# Patient Record
Sex: MALE | Race: WHITE | NOT HISPANIC OR LATINO | Employment: OTHER | URBAN - METROPOLITAN AREA
[De-identification: names, ages, dates, MRNs, and addresses within clinical notes are randomized per-mention and may not be internally consistent; named-entity substitution may affect disease eponyms.]

---

## 2017-01-01 ENCOUNTER — TELEPHONE (OUTPATIENT)
Dept: INTERNAL MEDICINE | Facility: CLINIC | Age: 82
End: 2017-01-01

## 2017-01-01 ENCOUNTER — LAB (OUTPATIENT)
Dept: INTERNAL MEDICINE | Facility: CLINIC | Age: 82
End: 2017-01-01

## 2017-01-01 ENCOUNTER — CLINICAL SUPPORT (OUTPATIENT)
Dept: INTERNAL MEDICINE | Facility: CLINIC | Age: 82
End: 2017-01-01

## 2017-01-01 ENCOUNTER — OFFICE VISIT (OUTPATIENT)
Dept: INTERNAL MEDICINE | Facility: CLINIC | Age: 82
End: 2017-01-01

## 2017-01-01 ENCOUNTER — CLINICAL SUPPORT NO REQUIREMENTS (OUTPATIENT)
Dept: CARDIOLOGY | Facility: CLINIC | Age: 82
End: 2017-01-01

## 2017-01-01 ENCOUNTER — OFFICE VISIT (OUTPATIENT)
Dept: PULMONOLOGY | Facility: CLINIC | Age: 82
End: 2017-01-01

## 2017-01-01 ENCOUNTER — ANTICOAGULATION VISIT (OUTPATIENT)
Dept: INTERNAL MEDICINE | Facility: CLINIC | Age: 82
End: 2017-01-01

## 2017-01-01 VITALS
BODY MASS INDEX: 17.74 KG/M2 | WEIGHT: 113 LBS | TEMPERATURE: 97 F | HEIGHT: 67 IN | HEART RATE: 57 BPM | RESPIRATION RATE: 16 BRPM | SYSTOLIC BLOOD PRESSURE: 110 MMHG | OXYGEN SATURATION: 94 % | DIASTOLIC BLOOD PRESSURE: 65 MMHG

## 2017-01-01 VITALS
WEIGHT: 116.13 LBS | DIASTOLIC BLOOD PRESSURE: 62 MMHG | RESPIRATION RATE: 16 BRPM | TEMPERATURE: 96.8 F | HEART RATE: 52 BPM | BODY MASS INDEX: 18.19 KG/M2 | SYSTOLIC BLOOD PRESSURE: 110 MMHG

## 2017-01-01 VITALS
HEART RATE: 52 BPM | BODY MASS INDEX: 18.14 KG/M2 | WEIGHT: 115.8 LBS | SYSTOLIC BLOOD PRESSURE: 102 MMHG | DIASTOLIC BLOOD PRESSURE: 78 MMHG

## 2017-01-01 DIAGNOSIS — R73.03 PREDIABETES: ICD-10-CM

## 2017-01-01 DIAGNOSIS — L89.92 DECUBITUS ULCER, STAGE II (HCC): ICD-10-CM

## 2017-01-01 DIAGNOSIS — J43.9 PULMONARY EMPHYSEMA, UNSPECIFIED EMPHYSEMA TYPE (HCC): Primary | ICD-10-CM

## 2017-01-01 DIAGNOSIS — I48.20 CHRONIC ATRIAL FIBRILLATION (HCC): ICD-10-CM

## 2017-01-01 DIAGNOSIS — Z13.220 LIPID SCREENING: ICD-10-CM

## 2017-01-01 DIAGNOSIS — R79.9 ABNORMAL FINDING OF BLOOD CHEMISTRY: ICD-10-CM

## 2017-01-01 DIAGNOSIS — E53.8 VITAMIN B 12 DEFICIENCY: ICD-10-CM

## 2017-01-01 DIAGNOSIS — I25.119 CORONARY ARTERY DISEASE WITH ANGINA PECTORIS, UNSPECIFIED VESSEL OR LESION TYPE, UNSPECIFIED WHETHER NATIVE OR TRANSPLANTED HEART (HCC): ICD-10-CM

## 2017-01-01 DIAGNOSIS — R54 FRAIL ELDERLY: Primary | ICD-10-CM

## 2017-01-01 DIAGNOSIS — J96.20 ACUTE ON CHRONIC RESPIRATORY FAILURE, UNSPECIFIED WHETHER WITH HYPOXIA OR HYPERCAPNIA (HCC): ICD-10-CM

## 2017-01-01 DIAGNOSIS — N40.1 BENIGN PROSTATIC HYPERPLASIA WITH LOWER URINARY TRACT SYMPTOMS, UNSPECIFIED MORPHOLOGY: Primary | ICD-10-CM

## 2017-01-01 DIAGNOSIS — Z72.0 TOBACCO ABUSE: ICD-10-CM

## 2017-01-01 DIAGNOSIS — I42.9 CARDIOMYOPATHY (HCC): Primary | ICD-10-CM

## 2017-01-01 DIAGNOSIS — I48.91 ATRIAL FIBRILLATION, UNSPECIFIED TYPE (HCC): Primary | ICD-10-CM

## 2017-01-01 LAB
ALBUMIN SERPL-MCNC: 4.2 G/DL (ref 3.2–4.8)
ALBUMIN/GLOB SERPL: 1.4 G/DL (ref 1.5–2.5)
ALP SERPL-CCNC: 60 U/L (ref 25–100)
ALT SERPL W P-5'-P-CCNC: 8 U/L (ref 7–40)
ANION GAP SERPL CALCULATED.3IONS-SCNC: 7 MMOL/L (ref 3–11)
ARTICHOKE IGE QN: 64 MG/DL (ref 0–130)
AST SERPL-CCNC: 12 U/L (ref 0–33)
BILIRUB SERPL-MCNC: 0.5 MG/DL (ref 0.3–1.2)
BUN BLD-MCNC: 21 MG/DL (ref 9–23)
BUN/CREAT SERPL: 23.3 (ref 7–25)
CALCIUM SPEC-SCNC: 8.9 MG/DL (ref 8.7–10.4)
CHLORIDE SERPL-SCNC: 97 MMOL/L (ref 99–109)
CHOLEST SERPL-MCNC: 152 MG/DL (ref 0–200)
CO2 SERPL-SCNC: 30 MMOL/L (ref 20–31)
CREAT BLD-MCNC: 0.9 MG/DL (ref 0.6–1.3)
DEPRECATED RDW RBC AUTO: 54.4 FL (ref 37–54)
ERYTHROCYTE [DISTWIDTH] IN BLOOD BY AUTOMATED COUNT: 15.1 % (ref 11.3–14.5)
GFR SERPL CREATININE-BSD FRML MDRD: 80 ML/MIN/1.73
GLOBULIN UR ELPH-MCNC: 2.9 GM/DL
GLUCOSE BLD-MCNC: 64 MG/DL (ref 70–100)
HCT VFR BLD AUTO: 37.9 % (ref 38.9–50.9)
HDLC SERPL-MCNC: 66 MG/DL (ref 40–60)
HGB BLD-MCNC: 12.4 G/DL (ref 13.1–17.5)
INR PPP: 1.3 (ref 2–3)
INR PPP: 1.6 (ref 2–3)
INR PPP: 1.8 (ref 2–3)
INR PPP: 2 (ref 2–3)
INR PPP: 2.2 (ref 2–3)
INR PPP: 2.3 (ref 2–3)
INR PPP: 2.6 (ref 2–3)
INR PPP: 3 (ref 2–3)
INR PPP: 3.2 (ref 2–3)
INR PPP: 3.4 (ref 2–3)
INR PPP: 3.5 (ref 2–3)
INR PPP: 3.8 (ref 2–3)
MCH RBC QN AUTO: 32 PG (ref 27–31)
MCHC RBC AUTO-ENTMCNC: 32.7 G/DL (ref 32–36)
MCV RBC AUTO: 97.9 FL (ref 80–99)
PLATELET # BLD AUTO: 155 10*3/MM3 (ref 150–450)
PMV BLD AUTO: 11 FL (ref 6–12)
POTASSIUM BLD-SCNC: 4.6 MMOL/L (ref 3.5–5.5)
PROT SERPL-MCNC: 7.1 G/DL (ref 5.7–8.2)
RBC # BLD AUTO: 3.87 10*6/MM3 (ref 4.2–5.76)
SODIUM BLD-SCNC: 134 MMOL/L (ref 132–146)
T4 FREE SERPL-MCNC: 1.52 NG/DL (ref 0.89–1.76)
TRIGL SERPL-MCNC: 82 MG/DL (ref 0–150)
TSH SERPL DL<=0.05 MIU/L-ACNC: 1.57 MIU/ML (ref 0.35–5.35)
WBC NRBC COR # BLD: 8.68 10*3/MM3 (ref 3.5–10.8)

## 2017-01-01 PROCEDURE — 96372 THER/PROPH/DIAG INJ SC/IM: CPT | Performed by: INTERNAL MEDICINE

## 2017-01-01 PROCEDURE — 85610 PROTHROMBIN TIME: CPT | Performed by: INTERNAL MEDICINE

## 2017-01-01 PROCEDURE — 71020 CHG CHEST X-RAY 2 VW: CPT | Performed by: NURSE PRACTITIONER

## 2017-01-01 PROCEDURE — 99214 OFFICE O/P EST MOD 30 MIN: CPT | Performed by: NURSE PRACTITIONER

## 2017-01-01 PROCEDURE — 36415 COLL VENOUS BLD VENIPUNCTURE: CPT | Performed by: INTERNAL MEDICINE

## 2017-01-01 PROCEDURE — 80061 LIPID PANEL: CPT | Performed by: INTERNAL MEDICINE

## 2017-01-01 PROCEDURE — 84439 ASSAY OF FREE THYROXINE: CPT | Performed by: INTERNAL MEDICINE

## 2017-01-01 PROCEDURE — G0008 ADMIN INFLUENZA VIRUS VAC: HCPCS | Performed by: INTERNAL MEDICINE

## 2017-01-01 PROCEDURE — 80053 COMPREHEN METABOLIC PANEL: CPT | Performed by: INTERNAL MEDICINE

## 2017-01-01 PROCEDURE — 84443 ASSAY THYROID STIM HORMONE: CPT | Performed by: INTERNAL MEDICINE

## 2017-01-01 PROCEDURE — 93296 REM INTERROG EVL PM/IDS: CPT | Performed by: PHYSICIAN ASSISTANT

## 2017-01-01 PROCEDURE — 93295 DEV INTERROG REMOTE 1/2/MLT: CPT | Performed by: PHYSICIAN ASSISTANT

## 2017-01-01 PROCEDURE — 99214 OFFICE O/P EST MOD 30 MIN: CPT | Performed by: INTERNAL MEDICINE

## 2017-01-01 PROCEDURE — 85610 PROTHROMBIN TIME: CPT

## 2017-01-01 PROCEDURE — 85027 COMPLETE CBC AUTOMATED: CPT | Performed by: INTERNAL MEDICINE

## 2017-01-01 PROCEDURE — 90662 IIV NO PRSV INCREASED AG IM: CPT | Performed by: INTERNAL MEDICINE

## 2017-01-01 RX ORDER — AZITHROMYCIN 250 MG/1
250 TABLET, FILM COATED ORAL DAILY
Qty: 30 TABLET | Refills: 8 | Status: SHIPPED | OUTPATIENT
Start: 2017-01-01

## 2017-01-01 RX ORDER — WARFARIN SODIUM 2 MG/1
TABLET ORAL
Qty: 30 TABLET | Refills: 3 | Status: SHIPPED | OUTPATIENT
Start: 2017-01-01

## 2017-01-01 RX ORDER — AZITHROMYCIN 250 MG/1
TABLET, FILM COATED ORAL
Qty: 30 TABLET | Refills: 8 | OUTPATIENT
Start: 2017-01-01

## 2017-01-01 RX ORDER — FERROUS SULFATE 325(65) MG
TABLET ORAL
Qty: 180 TABLET | Refills: 2 | Status: SHIPPED | OUTPATIENT
Start: 2017-01-01

## 2017-01-01 RX ORDER — DIGOXIN 125 MCG
TABLET ORAL
Qty: 30 TABLET | Refills: 3 | Status: SHIPPED | OUTPATIENT
Start: 2017-01-01

## 2017-01-01 RX ORDER — QUINAPRIL 10 MG/1
TABLET ORAL
Qty: 30 TABLET | Refills: 5 | Status: SHIPPED | OUTPATIENT
Start: 2017-01-01

## 2017-01-01 RX ORDER — ASPIRIN 81 MG/1
TABLET ORAL
Qty: 30 TABLET | Refills: 5 | Status: SHIPPED | OUTPATIENT
Start: 2017-01-01

## 2017-01-01 RX ORDER — SIMVASTATIN 40 MG
TABLET ORAL
Qty: 90 TABLET | Refills: 1 | Status: SHIPPED | OUTPATIENT
Start: 2017-01-01 | End: 2017-01-01 | Stop reason: SDUPTHER

## 2017-01-01 RX ORDER — OMEPRAZOLE 20 MG/1
20 CAPSULE, DELAYED RELEASE ORAL DAILY
Qty: 90 CAPSULE | Refills: 1 | Status: SHIPPED | OUTPATIENT
Start: 2017-01-01 | End: 2017-01-01

## 2017-01-01 RX ORDER — BENZONATATE 200 MG/1
CAPSULE ORAL
COMMUNITY
Start: 2015-07-06

## 2017-01-01 RX ORDER — TAMSULOSIN HYDROCHLORIDE 0.4 MG/1
1 CAPSULE ORAL NIGHTLY
Qty: 30 CAPSULE | Refills: 4 | Status: SHIPPED | OUTPATIENT
Start: 2017-01-01

## 2017-01-01 RX ORDER — CYANOCOBALAMIN 1000 UG/ML
1000 INJECTION, SOLUTION INTRAMUSCULAR; SUBCUTANEOUS
Status: SHIPPED | OUTPATIENT
Start: 2017-01-01

## 2017-01-01 RX ORDER — DIGOXIN 125 MCG
TABLET ORAL
Qty: 30 TABLET | Refills: 3 | Status: SHIPPED | OUTPATIENT
Start: 2017-01-01 | End: 2017-01-01 | Stop reason: SDUPTHER

## 2017-01-01 RX ORDER — WARFARIN SODIUM 2 MG/1
TABLET ORAL
Qty: 30 TABLET | Refills: 3 | Status: SHIPPED | OUTPATIENT
Start: 2017-01-01 | End: 2017-01-01 | Stop reason: SDUPTHER

## 2017-01-01 RX ORDER — SIMVASTATIN 40 MG
TABLET ORAL
Qty: 90 TABLET | Refills: 1 | Status: SHIPPED | OUTPATIENT
Start: 2017-01-01

## 2017-01-01 RX ORDER — OMEPRAZOLE 20 MG/1
CAPSULE, DELAYED RELEASE ORAL
Qty: 90 CAPSULE | Refills: 1 | OUTPATIENT
Start: 2017-01-01

## 2017-01-01 RX ORDER — CARVEDILOL 12.5 MG/1
12.5 TABLET ORAL 2 TIMES DAILY
Qty: 180 TABLET | Refills: 3 | Status: SHIPPED | OUTPATIENT
Start: 2017-01-01

## 2017-01-01 RX ORDER — QUINAPRIL 10 MG/1
TABLET ORAL
Qty: 90 TABLET | Refills: 1 | OUTPATIENT
Start: 2017-01-01

## 2017-01-01 RX ORDER — OXYCODONE HYDROCHLORIDE AND ACETAMINOPHEN 5; 325 MG/1; MG/1
TABLET ORAL
Refills: 0 | COMMUNITY
Start: 2017-01-01

## 2017-01-01 RX ORDER — ASPIRIN 81 MG/1
TABLET ORAL
Qty: 30 TABLET | Refills: 5 | Status: SHIPPED | OUTPATIENT
Start: 2017-01-01 | End: 2017-01-01 | Stop reason: SDUPTHER

## 2017-01-01 RX ORDER — OMEPRAZOLE 20 MG/1
CAPSULE, DELAYED RELEASE ORAL
Qty: 30 CAPSULE | Refills: 3 | OUTPATIENT
Start: 2017-01-01

## 2017-01-01 RX ADMIN — CYANOCOBALAMIN 1000 MCG: 1000 INJECTION, SOLUTION INTRAMUSCULAR; SUBCUTANEOUS at 09:55

## 2017-01-01 RX ADMIN — CYANOCOBALAMIN 1000 MCG: 1000 INJECTION, SOLUTION INTRAMUSCULAR; SUBCUTANEOUS at 09:25

## 2017-01-01 RX ADMIN — CYANOCOBALAMIN 1000 MCG: 1000 INJECTION, SOLUTION INTRAMUSCULAR; SUBCUTANEOUS at 10:49

## 2017-01-01 RX ADMIN — CYANOCOBALAMIN 1000 MCG: 1000 INJECTION, SOLUTION INTRAMUSCULAR; SUBCUTANEOUS at 11:29

## 2017-01-01 RX ADMIN — CYANOCOBALAMIN 1000 MCG: 1000 INJECTION, SOLUTION INTRAMUSCULAR; SUBCUTANEOUS at 11:04

## 2017-01-04 NOTE — TELEPHONE ENCOUNTER
----- Message from Marielle Xiao sent at 1/4/2017 10:56 AM EST -----  Contact: BHAVNA SMITH PH:[phone]  BHAVNA FROM [facility] CALLING IN REGARDS TO BREANA HERRERA, HE NEEDS A REFILL FOR HIS WARFARIN 2 MG, ASPIRIN AND DIGOXIN HE WOULD LIKE THIS IN A 90 DAY SUPPLY OF THESE MEDS. BHAVNA CAN BE REACHED [phone]

## 2017-01-04 NOTE — TELEPHONE ENCOUNTER
Shellie Padilla   to Shellie Padilla        12/29/16 9:35 AM   Note      S/W patients daughter informed her patient was past due for his coumadin check, she is aware of this patient has not been feeling well, but will get him in as soon as possible she stated.         ANUP

## 2017-03-20 NOTE — TELEPHONE ENCOUNTER
----- Message from Araceli Tellez sent at 3/20/2017  9:42 AM EDT -----  COLLEEN SIMON, PTS DAUGHTER CALLED WANTING TO KNOW THE STEPS TO TAKE TO GET PT PUT INTO A NURSING HOME. SHE CAN BE REACHED [phone].

## 2017-03-20 NOTE — TELEPHONE ENCOUNTER
They may want to call around to the different nursing homes and their community or the nursing home of interest.    Asks specifically what that nursing homes evaluation process is i.e. do they do their own evaluations there at the nursing home or some may require hospitalization admission for assessment before considerations for transfer.    I would start their first.    Be sure that the family goes to visit these sites.  There are different nursing home check list available online for focusing on certain  questions to ask the staff and administration at the nursing homes.

## 2017-07-10 NOTE — TELEPHONE ENCOUNTER
----- Message from Jane Taylor sent at 7/10/2017 12:40 PM EDT -----  [phone]    THEY HAVE RX FOR WHEELCHAIR.  NEEDS TO BE A TRANSPORT CHAIR, NOT A WHEELCHAIR AND NAME ON RX IS BREANA ESCOBAR-NEEDS TO BE JAVIER.  ALSO NEED DIAGNOSIS CODE ON IT    FAX NUMBER [phone]

## 2017-07-21 NOTE — TELEPHONE ENCOUNTER
----- Message from Marielle Xiao sent at 7/21/2017 12:07 PM EDT -----  Contact: [facility]  LUCAS FROM [facility] IN [place] CALLING IN REGARDS TO BREANA HERRERA. SHE IS NEEDING THE DETAILED WRITTEN ORDER FORM FOR TRANSPORT CHAIR SHE HAD FAXED OVER A COUPLE DAYS AGO. FAX: [phone] LUCAS CAN BE REACHED AT PH:[phone]

## 2017-08-04 NOTE — TELEPHONE ENCOUNTER
----- Message from Nat Ramires sent at 8/4/2017 11:22 AM EDT -----  DAUGHTER IS CALLING- PT NEEDS SIMVASTATIN AND OMEPRAZOLE RENEWALS. HAS AN APPT ON SEPT 11. OK TO LEAVE A MSG.     [facility]    PATIENT DAUGHTER. [phone] (COLLEEN)

## 2017-08-14 NOTE — PROGRESS NOTES
Pt complaining about not feeling well. He is in a wheelchair. He says he doesn't want to be here. I spoke with his daughter about him getting his device checked again in 8 months but if he is unable to come in to just call us to let us know.  His tachy therapies are turned off.  He has 2 yrs left on his battery for pacing. She understands.

## 2017-08-28 NOTE — PROGRESS NOTES
"[facility] Pulmonary Follow up    CHIEF COMPLAINT    COPD    HISTORY OF PRESENT ILLNESS    Garrick Duarte is a 86 y.o.male here today for follow-up on his severe COPD.  He last saw Colleen Phoenix on 7/6/15.     He is accompanied by his daughter today who reports he has been doing well since his last visit.  She denies any acute illnesses or exacerbations.  He has been on daily azithromycin for over 2 years.  He did run out and was unable to get this refilled.  However his daughter is also on azithromycin and has been sharing with him.  He denies any increase in his baseline cough or wheezing.  His cough is occasionally productive with clear to yellow sputum.  He denies fever, chills, or chest pain.    Unfortunately his wife is hospitalized in a LTACH.  He has moved in with his daughter during this hospitalization.  His daughter reports that she is now able to better manage his medications.  He was on Spiriva in the past but did not do well taking this.  His daughter thinks that she would be able to help him manage the inhaler and would like this re-ordered for him.  He is currently only using albuterol nebulizers 2-3 times daily.    He has supplemental oxygen at home but rarely wears this.  He states he will put it on \"if it's really needed\".  He also continues to smoke, approximately 1 pack per day.  He states that this is part of the reason he will not wear his oxygen as he does not want to have it on while smoking.    He remains anticoagulated on Coumadin for his atrial fibrillation.  He denies any hemoptysis or epistaxis.      Patient Active Problem List   Diagnosis   • Chronic atrial fibrillation   • Vitamin B12 deficiency   • Coronary artery disease involving autologous artery coronary bypass graft   • Old MI (myocardial infarction)   • Cardiomyopathy   • Simple chronic bronchitis   • Hiatal hernia   • Tobacco abuse   • Alcohol abuse   • ICD (implantable cardioverter-defibrillator) in place   • H/O: CVA " (cerebrovascular accident)   • Benign non-nodular prostatic hyperplasia without lower urinary tract symptoms   • Dyslipidemia   • PUD (peptic ulcer disease)   • History of positive PPD   • Acute on chronic respiratory failure   • Anemia   • Aneurysm of thoracic aorta   • Emphysema/COPD   • GERD (gastroesophageal reflux disease)   • Hypertension   • Malnutrition   • Ischemic heart disease   • Sick sinus syndrome       Allergies   Allergen Reactions   • Amiodarone        Current Outpatient Prescriptions:   •  albuterol (PROVENTIL HFA;VENTOLIN HFA) 108 (90 BASE) MCG/ACT inhaler, 1-2 puffs. every 4 to 6 hours as needed and as directed, Disp: , Rfl:   •  albuterol (PROVENTIL) (2.5 MG/3ML) 0.083% nebulizer solution, Use 1 unit dose every 4-6 hours as needed for wheezing, Disp: , Rfl:   •  aspirin 81 MG EC tablet, TAKE 1 TABLET DAILY AS DIRECTED., Disp: 30 tablet, Rfl: 5  •  azithromycin (ZITHROMAX) 250 MG tablet, CONTINUE TAKING 1 TABLET BY MOUTH EVERY DAY, Disp: 30 tablet, Rfl: 8  •  benzonatate (TESSALON) 200 MG capsule, Take 1 capsule by mouth 3 (three) times a day., Disp: , Rfl:   •  carvedilol (COREG) 12.5 MG tablet, Take 1 tablet by mouth 2 (Two) Times a Day., Disp: 180 tablet, Rfl: 3  •  cyanocobalamin 1000 MCG/ML injection, Inject 1 mL into the shoulder, thigh, or buttocks 1 (one) time per week., Disp: , Rfl:   •  digoxin (LANOXIN) 125 MCG tablet, TAKE 1 TABLET BY MOUTH EVERY DAY, Disp: 30 tablet, Rfl: 3  •  ferrous sulfate 325 (65 FE) MG tablet, TAKE 1 TABLET TWICE DAILY WITH MEALS., Disp: 180 tablet, Rfl: 2  •  GuaiFENesin ER 1200 MG tablet sustained-release 12 hour, Take 1 tablet by mouth every 12 (twelve) hours. daily, Disp: , Rfl:   •  omeprazole (priLOSEC) 20 MG capsule, Take 1 capsule by mouth Daily for 90 days., Disp: 90 capsule, Rfl: 1  •  oxyCODONE-acetaminophen (PERCOCET) 7.5-325 MG per tablet, Take  by mouth., Disp: , Rfl:   •  quinapril (ACCUPRIL) 10 MG tablet, TAKE 1 TABLET DAILY AS DIRECTED., Disp:  "30 tablet, Rfl: 5  •  simvastatin (ZOCOR) 40 MG tablet, TAKE 1 TABLET DAILY, Disp: 90 tablet, Rfl: 1  •  tamsulosin (FLOMAX) 0.4 MG capsule 24 hr capsule, Take  by mouth daily. one tablet, Disp: , Rfl:   •  warfarin (COUMADIN) 2 MG tablet, TAKE AS DIRECTED. TAKE ONE DAILY, Disp: 30 tablet, Rfl: 3    Current Facility-Administered Medications:   •  cyanocobalamin injection 1,000 mcg, 1,000 mcg, Intramuscular, Q28 Days, Patrick Turner MD, 1,000 mcg at 10/03/16 1054  •  cyanocobalamin injection 1,000 mcg, 1,000 mcg, Intramuscular, Q28 Days, Patrick Turner MD, 1,000 mcg at 07/17/17 0955  MEDICATION LIST AND ALLERGIES REVIEWED.    Social History   Substance Use Topics   • Smoking status: Current Every Day Smoker     Packs/day: 1.00     Years: 60.00   • Smokeless tobacco: None      Comment: The patient smokes greater than one pack of cigarettes a day for 60+ years.   • Alcohol use Yes      Comment: 3-4 beers a month       FAMILY AND SOCIAL HISTORY REVIEWED.    Review of Systems   Constitutional: Negative for chills, fatigue, fever and unexpected weight change.   HENT: Negative for congestion, nosebleeds, postnasal drip, rhinorrhea, sinus pressure and trouble swallowing.    Respiratory: Positive for cough and wheezing. Negative for chest tightness and shortness of breath.    Cardiovascular: Negative for chest pain and leg swelling.   Gastrointestinal: Negative for abdominal pain, constipation, diarrhea, nausea and vomiting.   Genitourinary: Negative for dysuria, frequency, hematuria and urgency.   Musculoskeletal: Negative for myalgias.   Neurological: Negative for dizziness, weakness, numbness and headaches.   All other systems reviewed and are negative.  .    /65  Pulse 57  Temp 97 °F (36.1 °C)  Resp 16  Ht 67\" (170.2 cm)  Wt 113 lb (51.3 kg)  SpO2 94% Comment: RA  BMI 17.7 kg/m2    Physical Exam   Constitutional: He is oriented to person, place, and time.   Chronically ill appearing elderly gentleman in a  " wheelchair   HENT:   Head: Normocephalic and atraumatic.   Neck: Neck supple.   Cardiovascular: Normal rate, regular rhythm and normal heart sounds.    No murmur heard.  Pulmonary/Chest: Effort normal and breath sounds normal. No respiratory distress. He has no wheezes.   Musculoskeletal: He exhibits no edema.   Neurological: He is alert and oriented to person, place, and time.   Skin: Skin is dry.   Psychiatric: He has a normal mood and affect. His behavior is normal.   Vitals reviewed.        RESULTS    PFTS in the office today, read by me: FEV1 0.91 L (41%), FVC 1.91 L.  59%), FEV1 FVC ratio 48%.  Severe obstruction, similar to prior testing.    PA and lateral chest x-ray performed in office today: Lungs hyperexpanded, ICD in place, no acute infiltrates.  Unchanged previous x-ray in 2014.    PROBLEM LIST    Problem List Items Addressed This Visit        Respiratory    Acute on chronic respiratory failure    Emphysema/COPD - Primary    Overview     A. Emphysema and chronic bronchitis with moderate physiology per PFTs. He continues to smoke cigarettes.         Relevant Orders    XR Chest PA & Lateral    Spirometry Without Bronchodilator       Other    Tobacco abuse            DISCUSSION    His spirometry and chest x-ray remain stable in comparison to previous testing.  Spiriva ordered at his daughter's request.  Continue albuterol use.  Azithromycin refilled.  I encouraged compliance with his oxygen, although not while smoking.  He is not interested in quitting smoking.  We discussed eventual long-term loss of lung function.    Follow-up in 6 months.    A total of 30 minutes was spent with face-to-face time in the office today discussing COPD management.  We spent time counseling on tobacco cessation, maintenance medication, the use of oxygen with chronic respiratory failure, patient and family education regarding disease management, as well as the importance of compliance with medication and regular  follow-up.    Shannan Faust, ANDREW  08/28/201710:37 AM  Electronically signed     Please note that portions of this note were completed with a voice recognition program. Efforts were made to edit the dictations, but occasionally words are mistranscribed.      CC: Patrick Turner MD

## 2017-09-11 NOTE — PROGRESS NOTES
Subjective   Garrick Duarte is a 86 y.o. male.     History of Present Illness     1 nocturia- secondary with BPH. Daughter says that he has an appointment with urology next month.  Patient says that he is having difficulty with evacuating his bladder with urine, urgency, frequency, no fever, chills, no nausea, no vomiting.  She says that Garrick used to be on Flomax but this was discontinued.    2 decubitus ulcer in the buttucks region   Localized to the right.  Daughter says that patient has had the decubitus ulcer for several months.  He refuses to get up out of the chair to which he spends the majority of his time watching TV or sleeping.        Review of Systems   All other systems reviewed and are negative.      Objective   Physical Exam   Constitutional: He is oriented to person, place, and time. He appears well-developed and well-nourished.   HENT:   Head: Normocephalic.   Right Ear: External ear normal.   Left Ear: External ear normal.   Nose: Nose normal.   Mouth/Throat: Oropharynx is clear and moist.   Eyes: Conjunctivae and EOM are normal. Pupils are equal, round, and reactive to light.   Neck: Normal range of motion. Neck supple.   Cardiovascular: Normal rate, regular rhythm, normal heart sounds and intact distal pulses.    Pulmonary/Chest: Effort normal and breath sounds normal.   Abdominal: Soft. Bowel sounds are normal.   Musculoskeletal: Normal range of motion.   Neurological: He is alert and oriented to person, place, and time. He has normal reflexes.   Skin: Skin is warm.   3 x 5 x 3.0 macular decubitus ulcer rash stage II on the right sacral posterior region.   Psychiatric: He has a normal mood and affect.   Nursing note and vitals reviewed.      Assessment/Plan   Garrick was seen today for med refill, benign prostatic hypertrophy and skin ulcer.    Diagnoses and all orders for this visit:    Benign prostatic hyperplasia with lower urinary tract symptoms, unspecified morphology-patient will start  taking the Flomax 0.4 mg to help with BPH, I have instructed daughter to make sure that he takes it at bedtime to prevent orthostatic hypotension in the a.m.    Decubitus ulcer, stage II  -     mupirocin (BACTROBAN) 2 % ointment; Applied topically to  the decubitus ulcer on buttocks  Topical Sween ointment that daughter says that she has used before for decubitus ulcers.    Vitamin B 12 deficiency  -     cyanocobalamin injection 1,000 mcg; Inject 1 mL into the shoulder, thigh, or buttocks Every 28 (Twenty-Eight) Days.

## 2017-10-11 NOTE — TELEPHONE ENCOUNTER
----- Message from Rajwinder Vu MA sent at 10/11/2017 11:17 AM EDT -----  Patients daughter dropped off handicap stickers, she is requesting once completed that they be mailed to [address] [place], [place] [id number] ([facility] address)

## 2017-10-19 NOTE — TELEPHONE ENCOUNTER
----- Message from Estela Baugh sent at 10/19/2017 10:13 AM EDT -----  Patient's step daughter Gila andrew called in regards to speaking with a nurse about receiving an order for patient to get home health, or placing patient into a nursing home. She has requested a call back when available on suggestions what to do.     Call back for step daughter: [phone] or [phone]    Thank you.

## 2017-10-19 NOTE — TELEPHONE ENCOUNTER
----- Message from Jane Ravi sent at 10/19/2017 10:48 AM EDT -----  [facility] [phone]    PTs STEPDAUGHTER CALLED ANNELISE.  WANTS A  EVAL.  TOLD HER SHE CALLED OUR OFFICE THIS AM AND HAS NOT HEARD BACK.. NEEDS PT,OT,NURSE, AND .  RECENT FALLS, LOWER EXTREMITY WEAKNESS, QUESTIONABLE CAREGIVER(WHO IS STEPDAUGHTER) ABILITY TO SUCCESSFULLY CARE FOR PT

## 2017-10-19 NOTE — TELEPHONE ENCOUNTER
Spoke with Wilda and she need the following sent: Demographic, last office visit, nursing, pt, ot, social work    Fax to  [phone]  CAD, AFib. Per verbal from Dr. Turner this has been faxed

## 2017-10-31 NOTE — TELEPHONE ENCOUNTER
----- Message from Grace Sanchez MA sent at 10/31/2017  9:09 AM EDT -----  nurse Michelle from [facility], called.  States pt has not had a PT/INR but they can do one today if needed.  Will just need a verbal order.     Michelle: [phone]

## 2017-11-01 NOTE — TELEPHONE ENCOUNTER
HOME HEALTH REGARDING HOSPITAL BED AND INR THAT WAS DONE YESTERDAY, HOME HEALTH WOULD LIKE A CALL BACK -[phone]. THANK YOU.

## 2017-11-01 NOTE — TELEPHONE ENCOUNTER
I spoke with Mirella yesterday regarding INR and she asked that the order be faxed to her (see message below). This has been faxed.

## 2022-09-20 NOTE — TELEPHONE ENCOUNTER
Go ahead and call in these refills. thanks   Detail Level: Generalized Hide Include Location In Plan Question?: No

## 2025-06-12 NOTE — TELEPHONE ENCOUNTER
RX sent to [facility]. [facility] informing Gila of this. Office number given.   Consent: The patient's consent was obtained including but not limited to risks of crusting, scabbing, blistering, scarring, darker or lighter pigmentary change, recurrence, incomplete removal and infection. Render Post-Care Instructions In Note?: yes Detail Level: Detailed Post-Care Instructions: I reviewed with the patient in detail post-care instructions. Patient is to wear sunprotection, and avoid picking at any of the treated lesions. Pt may apply Vaseline to crusted or scabbing areas. Duration Of Freeze Thaw-Cycle (Seconds): 9 Number Of Freeze-Thaw Cycles: 2 freeze-thaw cycles Render Note In Bullet Format When Appropriate: No Application Tool (Optional): Liquid Nitrogen Sprayer